# Patient Record
Sex: FEMALE | Race: BLACK OR AFRICAN AMERICAN | URBAN - METROPOLITAN AREA
[De-identification: names, ages, dates, MRNs, and addresses within clinical notes are randomized per-mention and may not be internally consistent; named-entity substitution may affect disease eponyms.]

---

## 2024-07-26 NOTE — PROGRESS NOTES
Name:  Dona Sy  YOB: 1995   MRN: [id number]      Office Visit: 7/29/2024        ASSESSMENT AND PLAN:  (Medical Decision Making)    Impression: 28 y.o. female with lifelong asthma, 5 months pregnant here to establish care due to increasing shortness of breath on exertion.    1. Shortness of breath  The patient is 5 months pregnant, she has underlying exercise-induced asthma and is an asthmatic all her life, her fractional excretion of nitric oxide is in the in intermediate range at 28 ppb indicating well-controlled asthmatic inflammation in her lungs.  I explained that she needed to distinguish between shortness of breath related to progressing pregnancy which in turn is related to hormonal changes fluid shifts anatomical changes and intermittent wheezing with exertion.  She seems to indicate that she wheezes when she exerts herself and uses albuterol reactively rather than proactively.  I suggested that the patient use her albuterol prior to any activity that causes her wheezing and she seemed to understand.  I explained that given her Kana being within the upper limit of normal or in the intermediate range her asthmatic inflammation in the lungs is controlled and I explained that pregnancy can either improve asthma symptoms not change them or make them worse.  I explained that she needed to use Arnuity Ellipta as currently use at 1 puff once a day and she is compliant with this, and if her symptoms become less controlled there is an option to increase to 2 puffs once a day or switch to a more intense regimen.  At this point it does not seem that this will be necessary  - Spirometry Without Bronchodilator  - AMB POC EXHALED NITRIC OXIDE    2. Mild persistent asthma without complication  With exercise component, well-controlled, patient will use albuterol prior to activities that are known to cause her wheezing.    3. Exercise-induced asthma  See discussion above    The patient will

## 2024-07-29 ENCOUNTER — OFFICE VISIT (OUTPATIENT)
Dept: PULMONOLOGY | Age: 29
End: 2024-07-29
Payer: COMMERCIAL

## 2024-07-29 VITALS
BODY MASS INDEX: 32.74 KG/M2 | WEIGHT: 216 LBS | RESPIRATION RATE: 18 BRPM | HEART RATE: 85 BPM | HEIGHT: 68 IN | DIASTOLIC BLOOD PRESSURE: 78 MMHG | TEMPERATURE: 97.4 F | SYSTOLIC BLOOD PRESSURE: 138 MMHG | OXYGEN SATURATION: 97 %

## 2024-07-29 DIAGNOSIS — J45.990 EXERCISE-INDUCED ASTHMA: ICD-10-CM

## 2024-07-29 DIAGNOSIS — J45.30 MILD PERSISTENT ASTHMA WITHOUT COMPLICATION: ICD-10-CM

## 2024-07-29 DIAGNOSIS — R06.02 SHORTNESS OF BREATH: Primary | ICD-10-CM

## 2024-07-29 LAB
EXPIRATORY TIME: NORMAL
FEF 25-75% %PRED-PRE: NORMAL
FEF 25-75% PRED: NORMAL
FEF 25-75-PRE: NORMAL
FEV1 %PRED-PRE: NORMAL %
FEV1 PRED: 3.15 L
FEV1/FVC %PRED-PRE: NORMAL
FEV1/FVC PRED: NORMAL
FEV1/FVC: 0.85 %
FEV1: 3.36 L
FVC %PRED-PRE: NORMAL %
FVC PRED: 3.73 L
FVC: 3.94 L
PEF %PRED-PRE: NORMAL
PEF PRED: NORMAL
PEF-PRE: NORMAL

## 2024-07-29 PROCEDURE — 99204 OFFICE O/P NEW MOD 45 MIN: CPT | Performed by: INTERNAL MEDICINE

## 2024-07-29 PROCEDURE — 94010 BREATHING CAPACITY TEST: CPT | Performed by: INTERNAL MEDICINE

## 2024-07-29 RX ORDER — FLUTICASONE FUROATE 100 UG/1
1 POWDER RESPIRATORY (INHALATION) 2 TIMES DAILY
COMMUNITY
Start: 2024-07-01

## 2024-07-29 RX ORDER — LABETALOL 300 MG/1
300 TABLET, FILM COATED ORAL 2 TIMES DAILY
COMMUNITY
Start: 2024-07-20

## 2024-07-29 RX ORDER — LABETALOL 200 MG/1
200 TABLET, FILM COATED ORAL DAILY
COMMUNITY
Start: 2024-07-24 | End: 2024-08-23

## 2024-07-29 RX ORDER — ONDANSETRON 4 MG/1
4 TABLET, ORALLY DISINTEGRATING ORAL EVERY 8 HOURS PRN
COMMUNITY
Start: 2024-05-01

## 2024-07-29 RX ORDER — DOXYLAMINE SUCCINATE AND PYRIDOXINE HYDROCHLORIDE, DELAYED RELEASE TABLETS 10 MG/10 MG 10; 10 MG/1; MG/1
TABLET, DELAYED RELEASE ORAL
COMMUNITY
Start: 2024-07-25

## 2024-07-29 ASSESSMENT — PULMONARY FUNCTION TESTS
FEV1_PREDICTED: 3.15
FVC_PREDICTED: 3.73
FEV1: 3.36
FVC: 3.94
FEV1/FVC: 0.85

## 2024-07-30 PROBLEM — O99.212 OBESITY AFFECTING PREGNANCY IN SECOND TRIMESTER: Status: ACTIVE | Noted: 2024-07-30

## 2024-07-30 PROBLEM — Z86.59 HISTORY OF DEPRESSION: Status: ACTIVE | Noted: 2018-11-05

## 2024-07-30 PROBLEM — Z87.59 HISTORY OF MIGRAINE DURING PREGNANCY: Status: ACTIVE | Noted: 2020-03-20

## 2024-07-30 PROBLEM — J45.909 ASTHMA AFFECTING PREGNANCY IN SECOND TRIMESTER: Status: ACTIVE | Noted: 2024-07-30

## 2024-07-30 PROBLEM — O99.512 ASTHMA AFFECTING PREGNANCY IN SECOND TRIMESTER: Status: ACTIVE | Noted: 2024-07-30

## 2024-07-30 PROBLEM — F32.2 CURRENT SEVERE EPISODE OF MAJOR DEPRESSIVE DISORDER WITHOUT PSYCHOTIC FEATURES WITHOUT PRIOR EPISODE (HCC): Status: ACTIVE | Noted: 2018-11-05

## 2024-07-30 PROBLEM — Z86.69 HISTORY OF MIGRAINE DURING PREGNANCY: Status: ACTIVE | Noted: 2020-03-20

## 2024-07-30 PROBLEM — O34.12 LEIOMYOMA OF UTERUS AFFECTING PREGNANCY IN SECOND TRIMESTER: Status: ACTIVE | Noted: 2024-07-30

## 2024-07-30 PROBLEM — D25.9 LEIOMYOMA OF UTERUS AFFECTING PREGNANCY IN SECOND TRIMESTER: Status: ACTIVE | Noted: 2024-07-30

## 2024-07-30 PROBLEM — O16.2 HYPERTENSION AFFECTING PREGNANCY IN SECOND TRIMESTER: Status: ACTIVE | Noted: 2024-07-30

## 2024-07-30 PROBLEM — G43.009 MIGRAINE WITHOUT AURA AND WITHOUT STATUS MIGRAINOSUS, NOT INTRACTABLE: Status: ACTIVE | Noted: 2020-03-20

## 2024-07-30 PROBLEM — O09.92 HIGH-RISK PREGNANCY IN SECOND TRIMESTER: Status: ACTIVE | Noted: 2024-07-30

## 2024-07-30 RX ORDER — ELETRIPTAN HYDROBROMIDE 40 MG/1
40 TABLET, FILM COATED ORAL
COMMUNITY
Start: 2024-03-11

## 2024-07-30 RX ORDER — FLUTICASONE FUROATE 200 UG/1
POWDER RESPIRATORY (INHALATION)
COMMUNITY

## 2024-08-06 ENCOUNTER — ROUTINE PRENATAL (OUTPATIENT)
Dept: OBGYN CLINIC | Age: 29
End: 2024-08-06
Payer: COMMERCIAL

## 2024-08-06 VITALS — SYSTOLIC BLOOD PRESSURE: 121 MMHG | DIASTOLIC BLOOD PRESSURE: 58 MMHG

## 2024-08-06 DIAGNOSIS — O16.2 HYPERTENSION AFFECTING PREGNANCY IN SECOND TRIMESTER: ICD-10-CM

## 2024-08-06 DIAGNOSIS — J45.909 ASTHMA AFFECTING PREGNANCY IN SECOND TRIMESTER: ICD-10-CM

## 2024-08-06 DIAGNOSIS — Z86.59 HISTORY OF DEPRESSION: ICD-10-CM

## 2024-08-06 DIAGNOSIS — O09.92 HIGH-RISK PREGNANCY IN SECOND TRIMESTER: Primary | ICD-10-CM

## 2024-08-06 DIAGNOSIS — O34.12 LEIOMYOMA OF UTERUS AFFECTING PREGNANCY IN SECOND TRIMESTER: ICD-10-CM

## 2024-08-06 DIAGNOSIS — E66.9 OBESITY, CLASS I, BMI 30-34.9: ICD-10-CM

## 2024-08-06 DIAGNOSIS — O99.212 OBESITY AFFECTING PREGNANCY IN SECOND TRIMESTER, UNSPECIFIED OBESITY TYPE: ICD-10-CM

## 2024-08-06 DIAGNOSIS — Z3A.21 21 WEEKS GESTATION OF PREGNANCY: ICD-10-CM

## 2024-08-06 DIAGNOSIS — O99.512 ASTHMA AFFECTING PREGNANCY IN SECOND TRIMESTER: ICD-10-CM

## 2024-08-06 DIAGNOSIS — D25.9 LEIOMYOMA OF UTERUS AFFECTING PREGNANCY IN SECOND TRIMESTER: ICD-10-CM

## 2024-08-06 PROCEDURE — 76827 ECHO EXAM OF FETAL HEART: CPT | Performed by: OBSTETRICS & GYNECOLOGY

## 2024-08-06 PROCEDURE — 76825 ECHO EXAM OF FETAL HEART: CPT | Performed by: OBSTETRICS & GYNECOLOGY

## 2024-08-06 PROCEDURE — 99204 OFFICE O/P NEW MOD 45 MIN: CPT | Performed by: OBSTETRICS & GYNECOLOGY

## 2024-08-06 PROCEDURE — 93325 DOPPLER ECHO COLOR FLOW MAPG: CPT | Performed by: OBSTETRICS & GYNECOLOGY

## 2024-08-06 PROCEDURE — 76811 OB US DETAILED SNGL FETUS: CPT | Performed by: OBSTETRICS & GYNECOLOGY

## 2024-08-06 ASSESSMENT — PATIENT HEALTH QUESTIONNAIRE - PHQ9
SUM OF ALL RESPONSES TO PHQ QUESTIONS 1-9: 0
SUM OF ALL RESPONSES TO PHQ9 QUESTIONS 1 & 2: 0
2. FEELING DOWN, DEPRESSED OR HOPELESS: NOT AT ALL
1. LITTLE INTEREST OR PLEASURE IN DOING THINGS: NOT AT ALL

## 2024-08-06 NOTE — PROGRESS NOTES
[facility]    [address] [place] [address]  P- [phone]  [phone]         MFM Consultation    Presents for evaluation of the following chief complaint(s):   Chief Complaint   Patient presents with    Ultrasound    High Risk Pregnancy     CHTN, Obesity         Dona Sy (1995) is a 28 y.o.  at 21w6d with 2024, by [facility].      Support person present today.   Personal and family history reviewed and updated as indicated.   Records from pregnancy reviewed. Chart updated to portray current issues.     Pt is scheduled to see Primary OB ([facility]) on 2024.  No HAs, edema.  Denies preeclamptic symptoms.  Reports good fetal movement.  No bleeding, LOF, cramping, ctxs, or vaginal pressure.        Mood evaluated today based on discussion with pt and PHQ screen.      2024    10:37 AM   PHQ-9    Little interest or pleasure in doing things 0   Feeling down, depressed, or hopeless 0   PHQ-2 Score 0   PHQ-9 Total Score 0      Mood Reassuring today    Addressed normal pregnancy complaints, reassured and offered suggestions for care  Reviewed gestational age precautions and activity goals/limitations  Nutritional counseling as well as specific goals based on current maternal and fetal status  Options for GERD, constipation, other common complaints reviewed.   Reviewed gestational age appropriate preventive care regarding communicable disease transmission and vaccines as appropriate (including flu, TDaP >28wk, RSV 32-36wk, and COVID.)  Mood counseling today      Vitals:    24 1130   BP: (!) 121/58      Physical Exam  Applicable labs reviewed.   Please see formal ultrasound report under imaging tab.      ASSESSMENT/PLAN:  Patient Active Problem List    Diagnosis Date Noted    High-risk pregnancy in second trimester 2024     Overview Note:     24 [facility]: Reassuring fetal status. Growth today appropriate AC; For full details review

## 2024-08-06 NOTE — ASSESSMENT & PLAN NOTE
Chronic Hypertension. Patient is currently taking medications for blood pressure control. BP stable today.      Chronic hypertension is present in 0.9-1.5% of pregnant women and may result in significant maternal, fetal, and  morbidity and mortality. The rate of maternal chronic hypertension increased by 67% from 2000 to 2009, with the largest increase (87%) among  women. This increase is largely secondary to the obesity epidemic and increasing maternal age and is expected to continue rising.     Traditionally, the definition of hypertension was systolic blood pressure >140 and/or diastolic >90 on at least two measures four or more hours apart. However, recent recommendations in the cardiology literature now suggest beginning treatment in nonpregnant adults with risk factors for current or future cardiovascular disease in patients with stage 1 hypertension (systolic blood pressure of 130-139 mm Hg or diastolic blood pressure of 80-89 mm Hg). The impact of this change on reproductive outcomes is unclear at this time. However, at this time, recommend continuing to treat patients already undergoing treatment prior to pregnancy as chronic hypertension in pregnancy. Until further data is available, would not begin treatment for blood pressures at this stage of hypertension in pregnancy.  Increased surveillance is recommended as these patients had a higher risk of preeclampsia, gestational diabetes, and indicated  birth than normotensive patients.     Chronic hypertension is considered to present prior to 20 weeks of gestation with gestational hypertension/preeclampsia developing after 20 weeks. Under diagnosis of chronic hypertension with third trimester exacerbation is common based on lack of blood pressure values available prior to initiation of prenatal care and the normal physiologic decrease in blood pressure in early pregnancy. Confounding the diagnosis further, approximately 11% of 
Asthma in pregnancy can be potentially life-threatening to mother and fetus.  Often, asthma will worsen in early third trimester. Recommend close monitoring and preventative therapy and aggressive treatment of exacerbations.      Patients with mild or well-controlled asthma will use an inhaled Beta-agonist (Albuterol type inhaler) prn or every 4-6 hours to control asthma. If patient requires use of rescue inhaler more than 2 times per week, worsening pulmonary function should be treated with the addition of an inhaled corticosteroid (ex. Flovent) BID.      For severe asthma or significant worsening of condition on the first 2 combined medications, patient should be assessed by her primary care physician. However, oral Prednisone 60 mgs daily for 1 week followed by a 10 day taper may be used.      Recommendation to decrease asthma triggers      testing twice weekly is recommended for severe asthma beginning at 32 weeks with growth assessment each month.     Avoid hemabate for treatment of uterine atony/pp hemorrhage.   
Genetic counseling was performed by physician after reviewing patient's genetic history.    The patient's Down syndrome age associated risk, as well as, risks of additional aneuploidy and genetic syndromes, are reduced by approximately 50% with a normal anatomy ultrasound. Ultrasound alone does not rule out all abnormalities of genetics and development.     Maternal serum screening for aneuploidy was discussed with the patient including first trimester FLORECITA-A/hCG, second trimester Quad screen (either in isolation or sequential with FLORECITA-A) as well as non-invasive prenatal testing (NIPT) for aneuploidy from a maternal blood sample.  Positive predictive and negative predictive values for these tests were explained, questions answered. Patient understands that these are screening tests that only assesses risk for select abnormalities (trisomies 13, 18, and 21, and sex chromosome abnormalities (NIPT), as well as markers for placental health (FLORECITA-A) and risk for open neural tube defects (quad)).  NIPT is designed for high risk populations, but should be considered by all patients who desire the current best option for screening for applicable genetic abnormalities.     Limitations of technology discussed based on maternal age, technical aspects of tests, and maternal BMI reviewed.  All questions answered and concerns discussed.     Patient elected to proceed with Ultrasound only with no serum screening.     
Preconception BMI ? 30 increases risk for pregnancy complications, including gestational diabetes, poor or accelerated fetal growth, hypertensive disorders of pregnancy, and abnormal labor progression. In addition, there is an increased risk of fetal demise, as well as congenital anomalies including neural tube defects, cardiac malformations, orofacial defects, and limb reduction abnormalities.     The risk for stillbirth increases with increasing obesity: class I obesity 1.3 [1.2-1.4], class II obesity 1.4 [1.3-1.6], class III obesity 1.9 [1.3-1.6]) and higher stillbirth risk in  obese women (1.9 [1.7-2.1]) than in  obese women (1.4 [1.3-1.5]). Among women with class III obesity (BMI ?40 kg/m2), the risk for stillbirth increased with advancing gestational age: 30 to 33 weeks, hazard and risk ratios 1.40 and 1.69, respectively; 37 to 39 weeks, hazard and risk ratios 3.20 and 2.95, respectively; and 40 to 42 weeks, hazard and risk ratios 3.30 and 8.95, respectively.           Recommend detailed first trimester ultrasound with NT at 12-13 weeks.   Recommend level II ultrasound for anatomy and fetal echo if prepregnancy BMI >30-35 based on AIUM guidelines.   Consider  testing beginning at 32-36 weeks due to risks of fetal demise, timing dependent on maternal and fetal comorbidities.   Early evaluation of insulin resistance with HgbA1c at initiation of care- if a1c > 5.5, then follow up with either \"2 step\" 1hr GCT/ 3hr GTT OR \"1 step\" 2hr GTT  Closely monitor blood pressure for development/worsening of hypertensive disorders of pregnancy.  Weight Gain Goal: <15 pounds, it is ok to stay same weight or lose as long as baby growing well  Dietary choices- low carb fine, avoid extreme keto (goal >50-75gm carb/day); not to use intermittent/prolonged fasting without specific discussion with physician.   Continue activity/exercise     The American College of Obstetricians and Gynecologists 
antidepressants at term reduces the risk of  complications, and tapering or stopping antidepressants can increase the maternal risk of  relapse. It is generally agreed the risks of  depression (especially recurrent episodes) exceed the risks of  complications.     It is important to continue to monitor mood in the  period, as more than 20% women will struggle with depression or other mood issues in pregnancy/postpartum. Those with a history will be at a much higher risk for exacerbation with the hormonal fluctuations of this period.     Postpartum [employer] (PSI).    PSI Warmline:  7-668-710-4PPD (1072).  WWW.POSTPARTUM.NET

## 2024-09-20 ENCOUNTER — HOSPITAL ENCOUNTER (OUTPATIENT)
Age: 29
Discharge: HOME | End: 2024-09-20
Attending: OBSTETRICS & GYNECOLOGY | Admitting: OBSTETRICS & GYNECOLOGY
Payer: COMMERCIAL

## 2024-09-20 PROCEDURE — 6360000002 HC RX W HCPCS: Performed by: OBSTETRICS & GYNECOLOGY

## 2024-09-20 PROCEDURE — 96372 THER/PROPH/DIAG INJ SC/IM: CPT

## 2024-09-20 RX ADMIN — RHO(D) IMMUNE GLOBULIN (HUMAN) 300 MCG: 1500 SOLUTION INTRAMUSCULAR at 11:34

## 2024-09-30 SDOH — ECONOMIC STABILITY: FOOD INSECURITY: WITHIN THE PAST 12 MONTHS, YOU WORRIED THAT YOUR FOOD WOULD RUN OUT BEFORE YOU GOT MONEY TO BUY MORE.: NEVER TRUE

## 2024-09-30 SDOH — ECONOMIC STABILITY: INCOME INSECURITY: HOW HARD IS IT FOR YOU TO PAY FOR THE VERY BASICS LIKE FOOD, HOUSING, MEDICAL CARE, AND HEATING?: NOT HARD AT ALL

## 2024-09-30 SDOH — ECONOMIC STABILITY: FOOD INSECURITY: WITHIN THE PAST 12 MONTHS, THE FOOD YOU BOUGHT JUST DIDN'T LAST AND YOU DIDN'T HAVE MONEY TO GET MORE.: NEVER TRUE

## 2024-09-30 SDOH — ECONOMIC STABILITY: TRANSPORTATION INSECURITY
IN THE PAST 12 MONTHS, HAS LACK OF TRANSPORTATION KEPT YOU FROM MEETINGS, WORK, OR FROM GETTING THINGS NEEDED FOR DAILY LIVING?: NO

## 2024-10-01 ENCOUNTER — ROUTINE PRENATAL (OUTPATIENT)
Dept: OBGYN CLINIC | Age: 29
End: 2024-10-01
Payer: COMMERCIAL

## 2024-10-01 VITALS — SYSTOLIC BLOOD PRESSURE: 115 MMHG | DIASTOLIC BLOOD PRESSURE: 74 MMHG | HEART RATE: 82 BPM

## 2024-10-01 DIAGNOSIS — O16.2 HYPERTENSION AFFECTING PREGNANCY IN SECOND TRIMESTER: ICD-10-CM

## 2024-10-01 DIAGNOSIS — Z87.59 HISTORY OF MIGRAINE DURING PREGNANCY: ICD-10-CM

## 2024-10-01 DIAGNOSIS — Z86.59 HISTORY OF DEPRESSION: Primary | ICD-10-CM

## 2024-10-01 DIAGNOSIS — D25.9 LEIOMYOMA OF UTERUS AFFECTING PREGNANCY IN SECOND TRIMESTER: ICD-10-CM

## 2024-10-01 DIAGNOSIS — Z3A.29 29 WEEKS GESTATION OF PREGNANCY: ICD-10-CM

## 2024-10-01 DIAGNOSIS — O99.212 OBESITY AFFECTING PREGNANCY IN SECOND TRIMESTER, UNSPECIFIED OBESITY TYPE: ICD-10-CM

## 2024-10-01 DIAGNOSIS — O34.12 LEIOMYOMA OF UTERUS AFFECTING PREGNANCY IN SECOND TRIMESTER: ICD-10-CM

## 2024-10-01 DIAGNOSIS — E66.811 CLASS 1 OBESITY WITHOUT SERIOUS COMORBIDITY WITH BODY MASS INDEX (BMI) OF 32.0 TO 32.9 IN ADULT, UNSPECIFIED OBESITY TYPE: ICD-10-CM

## 2024-10-01 DIAGNOSIS — O09.92 HIGH-RISK PREGNANCY IN SECOND TRIMESTER: ICD-10-CM

## 2024-10-01 DIAGNOSIS — Z86.69 HISTORY OF MIGRAINE DURING PREGNANCY: ICD-10-CM

## 2024-10-01 DIAGNOSIS — J45.909 ASTHMA AFFECTING PREGNANCY IN SECOND TRIMESTER: ICD-10-CM

## 2024-10-01 DIAGNOSIS — O99.512 ASTHMA AFFECTING PREGNANCY IN SECOND TRIMESTER: ICD-10-CM

## 2024-10-01 PROCEDURE — 99214 OFFICE O/P EST MOD 30 MIN: CPT | Performed by: OBSTETRICS & GYNECOLOGY

## 2024-10-01 PROCEDURE — 76819 FETAL BIOPHYS PROFIL W/O NST: CPT | Performed by: OBSTETRICS & GYNECOLOGY

## 2024-10-01 PROCEDURE — 76820 UMBILICAL ARTERY ECHO: CPT | Performed by: OBSTETRICS & GYNECOLOGY

## 2024-10-01 PROCEDURE — 76816 OB US FOLLOW-UP PER FETUS: CPT | Performed by: OBSTETRICS & GYNECOLOGY

## 2024-10-01 ASSESSMENT — PATIENT HEALTH QUESTIONNAIRE - PHQ9
SUM OF ALL RESPONSES TO PHQ QUESTIONS 1-9: 5
SUM OF ALL RESPONSES TO PHQ QUESTIONS 1-9: 5
SUM OF ALL RESPONSES TO PHQ9 QUESTIONS 1 & 2: 5
4. FEELING TIRED OR HAVING LITTLE ENERGY: MORE THAN HALF THE DAYS
6. FEELING BAD ABOUT YOURSELF - OR THAT YOU ARE A FAILURE OR HAVE LET YOURSELF OR YOUR FAMILY DOWN: SEVERAL DAYS
SUM OF ALL RESPONSES TO PHQ QUESTIONS 1-9: 5
3. TROUBLE FALLING OR STAYING ASLEEP: MORE THAN HALF THE DAYS
SUM OF ALL RESPONSES TO PHQ QUESTIONS 1-9: 5
2. FEELING DOWN, DEPRESSED OR HOPELESS: NEARLY EVERY DAY
10. IF YOU CHECKED OFF ANY PROBLEMS, HOW DIFFICULT HAVE THESE PROBLEMS MADE IT FOR YOU TO DO YOUR WORK, TAKE CARE OF THINGS AT HOME, OR GET ALONG WITH OTHER PEOPLE: VERY DIFFICULT
SUM OF ALL RESPONSES TO PHQ QUESTIONS 1-9: 5
8. MOVING OR SPEAKING SO SLOWLY THAT OTHER PEOPLE COULD HAVE NOTICED. OR THE OPPOSITE, BEING SO FIGETY OR RESTLESS THAT YOU HAVE BEEN MOVING AROUND A LOT MORE THAN USUAL: NOT AT ALL
5. POOR APPETITE OR OVEREATING: NOT AT ALL
SUM OF ALL RESPONSES TO PHQ QUESTIONS 1-9: 5
SUM OF ALL RESPONSES TO PHQ QUESTIONS 1-9: 5
7. TROUBLE CONCENTRATING ON THINGS, SUCH AS READING THE NEWSPAPER OR WATCHING TELEVISION: NOT AT ALL
SUM OF ALL RESPONSES TO PHQ QUESTIONS 1-9: 5
9. THOUGHTS THAT YOU WOULD BE BETTER OFF DEAD, OR OF HURTING YOURSELF: NOT AT ALL
1. LITTLE INTEREST OR PLEASURE IN DOING THINGS: MORE THAN HALF THE DAYS

## 2024-10-01 NOTE — PROGRESS NOTES
maternal and fetal status  Options for GERD, constipation, other common complaints reviewed.   Reviewed gestational age appropriate preventive care regarding communicable disease transmission and vaccines as appropriate (including flu, TDaP >28wk, RSV 32-36wk, and COVID.)    Exam:     Vitals:    10/01/24 0844   BP: 115/74   Pulse: 82      Physical Exam  Applicable labs reviewed.   Please see formal ultrasound report under imaging tab.      ASSESSMENT/PLAN:  Patient Active Problem List    Diagnosis Date Noted    Class 1 obesity without serious comorbidity in adult 10/01/2024    High-risk pregnancy in second trimester 07/30/2024     Overview Note:     08/06/24 [facility]: Reassuring fetal status. Growth today appropriate AC; For full details review formal ultrasound report.    10/01/24 [facility]:  Reassuring fetal status. Growth today appropriate AC; For full details review formal ultrasound report.    Follow up [facility] 4 weeks      Obesity affecting pregnancy in second trimester 07/30/2024    Hypertension affecting pregnancy in second trimester 07/30/2024     Overview Note:     Takes Labetalol 300 mg BID and 200mg daily at 1pm.    08/06/24 [facility]:  BP reassuring 125/28. Baseline Pre E labs WDL.  10/01/24 [facility]:  BP reassuring 115/74.  Stable on Labetalol, Pre E labs WDL.      Asthma affecting pregnancy in second trimester 07/30/2024    Leiomyoma of uterus affecting pregnancy in second trimester 07/30/2024    History of migraine during pregnancy 03/20/2020    History of depression 11/05/2018     Overview Note:      Dona reports having a 4-5 year history of depression from when she was a teenager.  She was diagnosed by a psychiatrist in 2014 and started on Effexor which did not help her mood.  She was admitted to a psychiatric hospital in 2015 after cutting her self on her forearms and presenting to an emergency department.  She was admitted for one week and discharged.  She was then started on zoloft, trazodone, and atarax which

## 2024-10-16 ENCOUNTER — FOLLOWUP TELEPHONE ENCOUNTER (OUTPATIENT)
Dept: CASE MANAGEMENT | Age: 29
End: 2024-10-16

## 2024-10-16 NOTE — TELEPHONE ENCOUNTER
Referral received from [facility] to reach out to patient.  Phone call to patient at [phone].    No answer; message left requesting call back.    SOPHIE Ruvalcaba, [facility]  [facility]   [phone]

## 2024-10-22 ENCOUNTER — FOLLOWUP TELEPHONE ENCOUNTER (OUTPATIENT)
Dept: CASE MANAGEMENT | Age: 29
End: 2024-10-22

## 2024-10-22 NOTE — TELEPHONE ENCOUNTER
Phone call to patient at [phone].     No answer; message left requesting call back.     RALEIGH Ruvalcaba-ОЛЕГ, [facility]-C  [facility]   [phone]